# Patient Record
(demographics unavailable — no encounter records)

---

## 2024-12-14 NOTE — HISTORY OF PRESENT ILLNESS
[de-identified] : rash on penis still present, no improvement. pt reports being painful. Father states requesting culture. ear pain x 3 days. congestion x couple weeks, worsening. no cough or fever. [FreeTextEntry6] : seen for rash on penis 2 months ago no improvement in symptoms c/o constant pain at tip of penis  diarrhea few days ago - resolved no vomiting

## 2024-12-14 NOTE — PHYSICAL EXAM
[TextEntry] : General:  no acute distress, alert   Ears:  clear tympanic membranes bilaterally  Nose:  pink nasal mucosa  Mouth:  nonerythematous oropharynx  Neck:  supple   Lungs:  clear to auscultation bilaterally  Cardiac:  regular rate and rhythm  Abdomen:  soft, non tender, non distended  :  erythema at urethral meatus, short penile frenulum Lymphatics:  no abnormal lymph nodes palpated Skin:  warm

## 2025-02-13 NOTE — REVIEW OF SYSTEMS
[Fever] : fever [Malaise] : malaise [Headache] : headache [Nasal Congestion] : nasal congestion [Sore Throat] : sore throat [Cough] : cough [Shortness of Breath] : no shortness of breath [Vomiting] : no vomiting [Diarrhea] : no diarrhea [Abdominal Pain] : abdominal pain [Myalgia] : myalgia [Negative] : Skin

## 2025-02-13 NOTE — HISTORY OF PRESENT ILLNESS
[de-identified] : cough, nasal congestion, body aches, fever X 4 days, exposed to flu, no N/V/D/C [FreeTextEntry6] : Cough, nasal congestion, ST , HA and body aches x 4-5 days. Fever on and off.

## 2025-02-13 NOTE — DISCUSSION/SUMMARY
[FreeTextEntry1] : Rapid flu and covid negative Throat cx if pos Amoxil  500 mg po  bid x 10 days Tylenol , Mucinex prn, fluids

## 2025-04-26 NOTE — HISTORY OF PRESENT ILLNESS
[de-identified] : right eye twitching red and itchy [FreeTextEntry6] : + itchy right eye and eyelid intermittently twitching, was red but no longer, seems dry to pt, no injuries to eye, no congestion or cough, no ST, no ear pain, no n/v/c/d, eating and drinking well, normal voiding. afebrile. + seasonal allergies- no current medications

## 2025-04-26 NOTE — REVIEW OF SYSTEMS
[Fever] : no fever [Eye Discharge] : no eye discharge [Eye Redness] : eye redness [Nasal Congestion] : no nasal congestion [Sore Throat] : no sore throat [Cough] : no cough [Vomiting] : no vomiting [Diarrhea] : no diarrhea

## 2025-04-26 NOTE — PHYSICAL EXAM
[Conjuctival Injection] : no conjunctival injection [Increased Tearing] : no increased tearing [Discharge] : no discharge [Eyelid Swelling] : no eyelid swelling [NL] : warm, clear

## 2025-04-26 NOTE — DISCUSSION/SUMMARY
[FreeTextEntry1] : D/W caregiver dry eyes and allergic conjunctivitis, advise natural tears, may trial OTC zaditor eye drops for itching; reviewed supportive care including antipyretics as needs, keep well hydrated; monitor for eyelid swelling, difficulty moving the eye, worsening redness and call if occurring for recheck. Eyelid twitching may be due to low potassium intake- advise trial banana daily, keep well  hydrated, call with concerns.  time spent: 25min